# Patient Record
Sex: FEMALE | Race: WHITE | NOT HISPANIC OR LATINO | Employment: FULL TIME | URBAN - METROPOLITAN AREA
[De-identification: names, ages, dates, MRNs, and addresses within clinical notes are randomized per-mention and may not be internally consistent; named-entity substitution may affect disease eponyms.]

---

## 2024-05-17 RX ORDER — FENOFIBRATE 160 MG/1
160 TABLET ORAL DAILY
Qty: 30 TABLET | OUTPATIENT
Start: 2024-05-17

## 2024-06-04 ENCOUNTER — HOSPITAL ENCOUNTER (OUTPATIENT)
Dept: RADIOLOGY | Facility: CLINIC | Age: 49
Discharge: HOME | End: 2024-06-04
Payer: COMMERCIAL

## 2024-06-04 ENCOUNTER — OFFICE VISIT (OUTPATIENT)
Dept: PRIMARY CARE | Facility: CLINIC | Age: 49
End: 2024-06-04
Payer: COMMERCIAL

## 2024-06-04 VITALS
HEIGHT: 66 IN | RESPIRATION RATE: 20 BRPM | OXYGEN SATURATION: 99 % | HEART RATE: 82 BPM | BODY MASS INDEX: 29.83 KG/M2 | SYSTOLIC BLOOD PRESSURE: 132 MMHG | WEIGHT: 185.6 LBS | DIASTOLIC BLOOD PRESSURE: 82 MMHG

## 2024-06-04 VITALS — BODY MASS INDEX: 29.73 KG/M2 | WEIGHT: 185 LBS | HEIGHT: 66 IN

## 2024-06-04 DIAGNOSIS — E78.00 PURE HYPERCHOLESTEROLEMIA: ICD-10-CM

## 2024-06-04 DIAGNOSIS — Z12.31 BREAST CANCER SCREENING BY MAMMOGRAM: ICD-10-CM

## 2024-06-04 DIAGNOSIS — Z72.0 TOBACCO USE: ICD-10-CM

## 2024-06-04 DIAGNOSIS — Z13.6 SCREENING FOR CARDIOVASCULAR CONDITION: ICD-10-CM

## 2024-06-04 DIAGNOSIS — Z00.00 ROUTINE MEDICAL EXAM: Primary | ICD-10-CM

## 2024-06-04 DIAGNOSIS — Z00.00 ROUTINE MEDICAL EXAM: ICD-10-CM

## 2024-06-04 LAB
ALBUMIN SERPL-MCNC: 4.6 G/DL (ref 3.5–5)
ALP BLD-CCNC: 59 U/L (ref 35–125)
ALT SERPL-CCNC: 17 U/L (ref 5–40)
ANION GAP SERPL CALC-SCNC: 15 MMOL/L
AST SERPL-CCNC: 13 U/L (ref 5–40)
BASOPHILS # BLD AUTO: 0.03 X10*3/UL (ref 0–0.1)
BASOPHILS NFR BLD AUTO: 0.4 %
BILIRUB SERPL-MCNC: 0.5 MG/DL (ref 0.1–1.2)
BUN SERPL-MCNC: 12 MG/DL (ref 8–25)
CALCIUM SERPL-MCNC: 9.4 MG/DL (ref 8.5–10.4)
CHLORIDE SERPL-SCNC: 104 MMOL/L (ref 97–107)
CHOLEST SERPL-MCNC: 200 MG/DL (ref 133–200)
CHOLEST/HDLC SERPL: 5.9 {RATIO}
CO2 SERPL-SCNC: 21 MMOL/L (ref 24–31)
CREAT SERPL-MCNC: 0.7 MG/DL (ref 0.4–1.6)
EGFRCR SERPLBLD CKD-EPI 2021: >90 ML/MIN/1.73M*2
EOSINOPHIL # BLD AUTO: 0.11 X10*3/UL (ref 0–0.7)
EOSINOPHIL NFR BLD AUTO: 1.5 %
ERYTHROCYTE [DISTWIDTH] IN BLOOD BY AUTOMATED COUNT: 13.9 % (ref 11.5–14.5)
GLUCOSE SERPL-MCNC: 95 MG/DL (ref 65–99)
HCT VFR BLD AUTO: 43.7 % (ref 36–46)
HDLC SERPL-MCNC: 34 MG/DL
HGB BLD-MCNC: 14.3 G/DL (ref 12–16)
IMM GRANULOCYTES # BLD AUTO: 0.01 X10*3/UL (ref 0–0.7)
IMM GRANULOCYTES NFR BLD AUTO: 0.1 % (ref 0–0.9)
LDLC SERPL CALC-MCNC: 129 MG/DL (ref 65–130)
LYMPHOCYTES # BLD AUTO: 3.13 X10*3/UL (ref 1.2–4.8)
LYMPHOCYTES NFR BLD AUTO: 43.8 %
MCH RBC QN AUTO: 28.9 PG (ref 26–34)
MCHC RBC AUTO-ENTMCNC: 32.7 G/DL (ref 32–36)
MCV RBC AUTO: 88 FL (ref 80–100)
MONOCYTES # BLD AUTO: 0.59 X10*3/UL (ref 0.1–1)
MONOCYTES NFR BLD AUTO: 8.3 %
NEUTROPHILS # BLD AUTO: 3.27 X10*3/UL (ref 1.2–7.7)
NEUTROPHILS NFR BLD AUTO: 45.9 %
NRBC BLD-RTO: 0 /100 WBCS (ref 0–0)
PLATELET # BLD AUTO: 258 X10*3/UL (ref 150–450)
POTASSIUM SERPL-SCNC: 4.2 MMOL/L (ref 3.4–5.1)
PROT SERPL-MCNC: 7.2 G/DL (ref 5.9–7.9)
RBC # BLD AUTO: 4.95 X10*6/UL (ref 4–5.2)
SODIUM SERPL-SCNC: 140 MMOL/L (ref 133–145)
TRIGL SERPL-MCNC: 183 MG/DL (ref 40–150)
TSH SERPL DL<=0.05 MIU/L-ACNC: 1.2 MIU/L (ref 0.27–4.2)
WBC # BLD AUTO: 7.1 X10*3/UL (ref 4.4–11.3)

## 2024-06-04 PROCEDURE — 77063 BREAST TOMOSYNTHESIS BI: CPT | Performed by: RADIOLOGY

## 2024-06-04 PROCEDURE — 84443 ASSAY THYROID STIM HORMONE: CPT | Performed by: NURSE PRACTITIONER

## 2024-06-04 PROCEDURE — 77067 SCR MAMMO BI INCL CAD: CPT | Performed by: RADIOLOGY

## 2024-06-04 PROCEDURE — 85025 COMPLETE CBC W/AUTO DIFF WBC: CPT | Performed by: NURSE PRACTITIONER

## 2024-06-04 PROCEDURE — 36415 COLL VENOUS BLD VENIPUNCTURE: CPT | Performed by: NURSE PRACTITIONER

## 2024-06-04 PROCEDURE — 80061 LIPID PANEL: CPT | Performed by: NURSE PRACTITIONER

## 2024-06-04 PROCEDURE — 77067 SCR MAMMO BI INCL CAD: CPT

## 2024-06-04 PROCEDURE — 99407 BEHAV CHNG SMOKING > 10 MIN: CPT | Performed by: NURSE PRACTITIONER

## 2024-06-04 PROCEDURE — 99386 PREV VISIT NEW AGE 40-64: CPT | Performed by: NURSE PRACTITIONER

## 2024-06-04 PROCEDURE — 84075 ASSAY ALKALINE PHOSPHATASE: CPT | Performed by: NURSE PRACTITIONER

## 2024-06-04 RX ORDER — FENOFIBRATE 160 MG/1
160 TABLET ORAL DAILY
COMMUNITY
End: 2024-06-04 | Stop reason: SDUPTHER

## 2024-06-04 RX ORDER — BUPROPION HYDROCHLORIDE 150 MG/1
150 TABLET ORAL EVERY MORNING
Qty: 90 TABLET | Refills: 3 | Status: SHIPPED | OUTPATIENT
Start: 2024-06-04 | End: 2025-06-04

## 2024-06-04 RX ORDER — BUPROPION HYDROCHLORIDE 150 MG/1
150 TABLET ORAL EVERY MORNING
Qty: 30 TABLET | Refills: 1 | Status: SHIPPED | OUTPATIENT
Start: 2024-06-04 | End: 2024-06-04 | Stop reason: SDUPTHER

## 2024-06-04 RX ORDER — FENOFIBRATE 160 MG/1
160 TABLET ORAL DAILY
Qty: 90 TABLET | Refills: 3 | Status: SHIPPED | OUTPATIENT
Start: 2024-06-04 | End: 2025-06-04

## 2024-06-04 ASSESSMENT — ENCOUNTER SYMPTOMS
CARDIOVASCULAR NEGATIVE: 1
MUSCULOSKELETAL NEGATIVE: 1
LOSS OF SENSATION IN FEET: 0
CONSTITUTIONAL NEGATIVE: 1
OCCASIONAL FEELINGS OF UNSTEADINESS: 0
DEPRESSION: 0
NEUROLOGICAL NEGATIVE: 1
RESPIRATORY NEGATIVE: 1
GASTROINTESTINAL NEGATIVE: 1

## 2024-06-04 ASSESSMENT — PATIENT HEALTH QUESTIONNAIRE - PHQ9
SUM OF ALL RESPONSES TO PHQ9 QUESTIONS 1 AND 2: 0
1. LITTLE INTEREST OR PLEASURE IN DOING THINGS: NOT AT ALL
2. FEELING DOWN, DEPRESSED OR HOPELESS: NOT AT ALL

## 2024-06-04 ASSESSMENT — PAIN SCALES - GENERAL: PAINLEVEL: 0-NO PAIN

## 2024-06-04 NOTE — PROGRESS NOTES
"Subjective   Patient ID: Madison Fabian is a 49 y.o. female who presents for Annual Exam (Pt here for CPE and fasting lab work).  LMP 5/6/24, periods irregular   Sees Dr Vazquez for GYN pap over due  Colonscopy for IBS 2022  Gooing through divorce started smoking again would like to start wellbutrin   HPI     Review of Systems   Constitutional: Negative.    HENT: Negative.     Respiratory: Negative.     Cardiovascular: Negative.    Gastrointestinal: Negative.    Genitourinary: Negative.    Musculoskeletal: Negative.    Neurological: Negative.        Objective   /82   Pulse 82   Resp 20   Ht 1.676 m (5' 6\")   Wt 84.2 kg (185 lb 9.6 oz)   SpO2 99%   BMI 29.96 kg/m²     Physical Exam  Vitals and nursing note reviewed.   Constitutional:       General: She is not in acute distress.  HENT:      Right Ear: Tympanic membrane and ear canal normal.      Left Ear: Tympanic membrane and ear canal normal.      Nose: Nose normal. No rhinorrhea.      Mouth/Throat:      Pharynx: Oropharynx is clear. No oropharyngeal exudate or posterior oropharyngeal erythema.      Comments: Dentition wnl  Eyes:      Extraocular Movements: Extraocular movements intact.      Conjunctiva/sclera: Conjunctivae normal.      Pupils: Pupils are equal, round, and reactive to light.   Neck:      Vascular: No carotid bruit.   Cardiovascular:      Rate and Rhythm: Normal rate and regular rhythm.      Heart sounds: Normal heart sounds. No murmur heard.  Pulmonary:      Breath sounds: Normal breath sounds. No wheezing or rhonchi.   Abdominal:      General: Bowel sounds are normal. There is no distension.      Palpations: Abdomen is soft. There is no mass.      Tenderness: There is no abdominal tenderness. There is no guarding or rebound.      Hernia: No hernia is present.   Musculoskeletal:         General: No swelling or tenderness. Normal range of motion.      Cervical back: Normal range of motion and neck supple.   Lymphadenopathy:      " Cervical: No cervical adenopathy.   Skin:     General: Skin is warm.      Findings: No rash.   Neurological:      General: No focal deficit present.      Mental Status: She is alert.         Assessment/Plan   Problem List Items Addressed This Visit    None  Visit Diagnoses         Codes    Routine medical exam    -  Primary Z00.00    Relevant Orders    CBC and Auto Differential    Comprehensive Metabolic Panel    Lipid Panel    TSH with reflex to Free T4 if abnormal    BI mammo bilateral screening tomosynthesis    Pure hypercholesterolemia     E78.00    Relevant Orders    CBC and Auto Differential    Comprehensive Metabolic Panel    Lipid Panel    TSH with reflex to Free T4 if abnormal    BI mammo bilateral screening tomosynthesis    Breast cancer screening by mammogram     Z12.31    Relevant Orders    BI mammo bilateral screening tomosynthesis    Tobacco use     Z72.0    Relevant Medications    buPROPion XL (Wellbutrin XL) 150 mg 24 hr tablet    Screening for cardiovascular condition     Z13.6    Relevant Medications    fenofibrate (Triglide) 160 mg tablet    Other Relevant Orders    CT cardiac scoring wo IV contrast          Subjective:  Chief Complaints: See Social hx        Medical History:    Objective:    Assessment:  Tobacco abuse counseling Z71.6    Plan:  Procedure Codes: 64200 FF SMOKING COUNSELING 10 MINUTES  Preventive Medicine:      Preventive Counselling:  Smoking cessation:  Patent was warned about long term effects of long standing smoking. These risks include but not limited to head and neck cancer, lung and other organ cancers, COPD, Emphysema and recurrent infections.     Billing Information:    Visit Code:     Procedure Codes:   15840 FF SMOKING COUNSELING 10 MINUTES.

## 2024-06-27 ENCOUNTER — HOSPITAL ENCOUNTER (OUTPATIENT)
Dept: RADIOLOGY | Facility: CLINIC | Age: 49
Discharge: HOME | End: 2024-06-27
Payer: COMMERCIAL

## 2024-06-27 DIAGNOSIS — Z13.6 SCREENING FOR CARDIOVASCULAR CONDITION: ICD-10-CM

## 2024-06-27 PROCEDURE — 75571 CT HRT W/O DYE W/CA TEST: CPT

## 2025-05-19 DIAGNOSIS — Z13.6 SCREENING FOR CARDIOVASCULAR CONDITION: ICD-10-CM

## 2025-05-19 RX ORDER — FENOFIBRATE 160 MG/1
TABLET ORAL
Qty: 90 TABLET | Refills: 3 | Status: SHIPPED | OUTPATIENT
Start: 2025-05-19